# Patient Record
Sex: FEMALE | Race: WHITE | NOT HISPANIC OR LATINO | URBAN - NONMETROPOLITAN AREA
[De-identification: names, ages, dates, MRNs, and addresses within clinical notes are randomized per-mention and may not be internally consistent; named-entity substitution may affect disease eponyms.]

---

## 2020-08-04 ENCOUNTER — OFFICE VISIT (OUTPATIENT)
Dept: URBAN - NONMETROPOLITAN AREA CLINIC 13 | Facility: CLINIC | Age: 75
End: 2020-08-04
Payer: MEDICARE

## 2020-08-04 DIAGNOSIS — K58.9 IBS (IRRITABLE BOWEL SYNDROME): ICD-10-CM

## 2020-08-04 DIAGNOSIS — R10.13 EPIGASTRIC ABDOMINAL PAIN: ICD-10-CM

## 2020-08-04 PROCEDURE — 99214 OFFICE O/P EST MOD 30 MIN: CPT | Performed by: INTERNAL MEDICINE

## 2020-08-04 PROCEDURE — 1036F TOBACCO NON-USER: CPT | Performed by: INTERNAL MEDICINE

## 2020-08-04 RX ORDER — PREDNISONE 5 MG/1
TAKE 1 TABLET (5 MG) BY ORAL ROUTE ONCE DAILY TABLET ORAL 1
Qty: 0 | Refills: 0 | Status: ACTIVE | COMMUNITY
Start: 1900-01-01

## 2020-08-04 RX ORDER — PIOGLITAZONE 30 MG/1
TAKE 1 TABLET (30 MG) BY ORAL ROUTE ONCE DAILY TABLET ORAL 1
Qty: 0 | Refills: 0 | Status: ACTIVE | COMMUNITY
Start: 1900-01-01

## 2020-08-04 RX ORDER — METOPROLOL TARTRATE 50 MG/1
TAKE 1 TABLET (50 MG) BY ORAL ROUTE 2 TIMES PER DAY WITH MEALS TABLET, FILM COATED ORAL 2
Qty: 0 | Refills: 0 | Status: ACTIVE | COMMUNITY
Start: 1900-01-01

## 2020-08-04 RX ORDER — DICYCLOMINE HYDROCHLORIDE 10 MG/1
TAKE 1 CAPSULE (10 MG) BY ORAL ROUTE 4 TIMES PER DAY FOR 30 DAYS CAPSULE ORAL
Qty: 120 | Refills: 3 | Status: ACTIVE | COMMUNITY
Start: 2020-04-28 | End: 2020-08-26

## 2020-08-04 RX ORDER — GABAPENTIN 300 MG/1
TAKE 1 CAPSULE BY ORAL ROUTE DAILY CAPSULE ORAL 1
Qty: 0 | Refills: 0 | Status: ACTIVE | COMMUNITY
Start: 1900-01-01

## 2020-08-04 RX ORDER — ATORVASTATIN CALCIUM 20 MG/1
TAKE 1 TABLET (20 MG) BY ORAL ROUTE ONCE DAILY AT BEDTIME TABLET, FILM COATED ORAL 1
Qty: 0 | Refills: 0 | Status: ACTIVE | COMMUNITY
Start: 1900-01-01

## 2020-08-04 RX ORDER — OMEPRAZOLE 40 MG/1
CAPSULE, DELAYED RELEASE PELLETS ORAL
Qty: 0 | Refills: 0 | Status: ACTIVE | COMMUNITY
Start: 1900-01-01

## 2020-08-04 RX ORDER — LEUCOVORIN CALCIUM 5 MG/1
TAKE 1 TABLET (5 MG) BY ORAL ROUTE ONCE A WEEK TABLET ORAL 1
Qty: 0 | Refills: 0 | Status: ACTIVE | COMMUNITY
Start: 1900-01-01

## 2020-08-04 NOTE — HPI-TODAY'S VISIT:
History Of Present Illness    History Of Present Illness    Ms. Gina Alegria is here for f/u of abdominal pain, nausea, and diarrhea. Last year, she had leg ulcers and on multiple medications. She had likely SIBO and post infectious gastroparesis. She was started on low residue diet, zantac, and AMT. These symptoms resolved and she was able to eat a regular diet. Since her last OV, she weaned off zantac. She had been doing well until she had knee replacement last month. Her reflux/nausea started coming back. She started back zantac 150mg BID and this is helping. She is currently not taking AMT because she is taking pain medication at night and concerned about over sedation. She denies any abdominal pain but has been more bloated. She is having some mild constipation covered with colace and miralax prn. Overall, she feels well today.  4/28 Gina is here for returns Telehealth visit. She was restartedon AMT last visit. She had been on colace and miralax as well for her bowels.  Today she is doing ok. Her bowels seem to be stable. She is on zantac but is off that now. She is on Nexium now but is not helping as much. She is not on bentyl currently. She is having back surgery in a few weeks I Ms. Alegria returns for follow-up.  She recently had back surgery with Dr. Mckinney.  She is slowly recovering from that.  She continues to take 25 mg of amitriptyline.  She remains on these omeprazole and a daily stool softener.  Her bowels for the most part seem to be doing well.  She does have dietary indiscretion at times which makes her symptoms worse.  She has no complaints or other concerns at this time.

## 2021-03-01 ENCOUNTER — TELEPHONE ENCOUNTER (OUTPATIENT)
Dept: URBAN - METROPOLITAN AREA CLINIC 92 | Facility: CLINIC | Age: 76
End: 2021-03-01

## 2021-04-05 ENCOUNTER — OFFICE VISIT (OUTPATIENT)
Dept: URBAN - NONMETROPOLITAN AREA CLINIC 2 | Facility: CLINIC | Age: 76
End: 2021-04-05
Payer: MEDICARE

## 2021-04-05 VITALS
BODY MASS INDEX: 48.87 KG/M2 | HEART RATE: 83 BPM | WEIGHT: 265.6 LBS | DIASTOLIC BLOOD PRESSURE: 83 MMHG | SYSTOLIC BLOOD PRESSURE: 171 MMHG | TEMPERATURE: 96.9 F | HEIGHT: 62 IN

## 2021-04-05 DIAGNOSIS — K58.9 IBS (IRRITABLE BOWEL SYNDROME): ICD-10-CM

## 2021-04-05 DIAGNOSIS — R10.13 EPIGASTRIC ABDOMINAL PAIN: ICD-10-CM

## 2021-04-05 PROCEDURE — 99213 OFFICE O/P EST LOW 20 MIN: CPT | Performed by: NURSE PRACTITIONER

## 2021-04-05 RX ORDER — HYDROXYCHLOROQUINE SULFATE 200 MG/1
AS DIRECTED TABLET, FILM COATED ORAL
Status: ACTIVE | COMMUNITY

## 2021-04-05 RX ORDER — OMEPRAZOLE 40 MG/1
CAPSULE, DELAYED RELEASE PELLETS ORAL
Qty: 0 | Refills: 0 | Status: ON HOLD | COMMUNITY
Start: 1900-01-01

## 2021-04-05 RX ORDER — GABAPENTIN 300 MG/1
TAKE 1 CAPSULE BY ORAL ROUTE DAILY CAPSULE ORAL 1
Qty: 0 | Refills: 0 | Status: ACTIVE | COMMUNITY
Start: 1900-01-01

## 2021-04-05 RX ORDER — PREDNISONE 5 MG/1
TAKE 1 TABLET (5 MG) BY ORAL ROUTE ONCE DAILY TABLET ORAL 1
Qty: 0 | Refills: 0 | Status: ACTIVE | COMMUNITY
Start: 1900-01-01

## 2021-04-05 RX ORDER — TRAMADOL HYDROCHLORIDE 50 MG/1
1 TABLET AS NEEDED TABLET, FILM COATED ORAL ONCE A DAY
Status: ACTIVE | COMMUNITY

## 2021-04-05 RX ORDER — CYCLOBENZAPRINE HYDROCHLORIDE 10 MG/1
1 TABLET AT BEDTIME AS NEEDED TABLET, FILM COATED ORAL ONCE A DAY
Status: ACTIVE | COMMUNITY

## 2021-04-05 RX ORDER — ESOMEPRAZOLE MAGNESIUM 40 MG/1
1 CAPSULE CAPSULE, DELAYED RELEASE ORAL ONCE A DAY
Status: ACTIVE | COMMUNITY

## 2021-04-05 RX ORDER — AMITRIPTYLINE HYDROCHLORIDE 25 MG/1
1 TABLET AT BEDTIME TABLET, FILM COATED ORAL ONCE A DAY
Qty: 90 TABLET | Refills: 3

## 2021-04-05 RX ORDER — PIOGLITAZONE 30 MG/1
TAKE 1 TABLET (30 MG) BY ORAL ROUTE ONCE DAILY TABLET ORAL 1
Qty: 0 | Refills: 0 | Status: ON HOLD | COMMUNITY
Start: 1900-01-01

## 2021-04-05 RX ORDER — ATORVASTATIN CALCIUM 20 MG/1
TAKE 1 TABLET (20 MG) BY ORAL ROUTE ONCE DAILY AT BEDTIME TABLET, FILM COATED ORAL 1
Qty: 0 | Refills: 0 | Status: ACTIVE | COMMUNITY
Start: 1900-01-01

## 2021-04-05 RX ORDER — ACETAMINOPHEN 325 MG
1 TABLET AS NEEDED TABLET ORAL
Status: ACTIVE | COMMUNITY

## 2021-04-05 RX ORDER — AMITRIPTYLINE HYDROCHLORIDE 25 MG/1
1 TABLET AT BEDTIME TABLET, FILM COATED ORAL ONCE A DAY
Status: ACTIVE | COMMUNITY

## 2021-04-05 RX ORDER — METOPROLOL TARTRATE 50 MG/1
TAKE 1 TABLET (50 MG) BY ORAL ROUTE 2 TIMES PER DAY WITH MEALS TABLET, FILM COATED ORAL 2
Qty: 0 | Refills: 0 | Status: ACTIVE | COMMUNITY
Start: 1900-01-01

## 2021-04-05 RX ORDER — LEUCOVORIN CALCIUM 5 MG/1
TAKE 1 TABLET (5 MG) BY ORAL ROUTE ONCE A WEEK TABLET ORAL 1
Qty: 0 | Refills: 0 | Status: ON HOLD | COMMUNITY
Start: 1900-01-01

## 2021-04-05 NOTE — PHYSICAL EXAM CONSTITUTIONAL:
well developed, obsese , in no acute distress , ambulating without difficulty , normal communication ability

## 2021-04-05 NOTE — HPI-TODAY'S VISIT:
4/5/2021 Ms. Alegria is here for f/u of IBS, GERD, and gastroparesis. She was last seen in August. She has been on generic nexium and AMT 25mg at night. Her nausea, vomiting, and stomach pain are well controlled. She will have some bloating after eating fried foods. She has hard stools controlled by colace. She wonders if its ok to take this daily. Overall, she is feeling well today and has no GI complaints. CS

## 2021-04-05 NOTE — HPI-OTHER HISTORIES
Ms. Gina Alegria is here for f/u of abdominal pain, nausea, and diarrhea. Last year, she had leg ulcers and on multiple medications. She had likely SIBO and post infectious gastroparesis. She was started on low residue diet, zantac, and AMT. These symptoms resolved and she was able to eat a regular diet. Since her last OV, she weaned off zantac. She had been doing well until she had knee replacement last month. Her reflux/nausea started coming back. She started back zantac 150mg BID and this is helping. She is currently not taking AMT because she is taking pain medication at night and concerned about over sedation. She denies any abdominal pain but has been more bloated. She is having some mild constipation covered with colace and miralax prn. Overall, she feels well today. CS 4/28 Gina is here for returns Telehealth visit. She was restartedon AMT last visit. She had been on colace and miralax as well for her bowels.  Today she is doing ok. Her bowels seem to be stable. She is on zantac but is off that now. She is on Nexium now but is not helping as much. She is not on bentyl currently. She is having back surgery in a few weeks  8/4/2020 Ms. Alegria returns for follow-up.  She recently had back surgery with Dr. Mckinney.  She is slowly recovering from that.  She continues to take 25 mg of amitriptyline.  She remains on these omeprazole and a daily stool softener.  Her bowels for the most part seem to be doing well.  She does have dietary indiscretion at times which makes her symptoms worse.  She has no complaints or other concerns at this time.

## 2022-04-08 ENCOUNTER — TELEPHONE ENCOUNTER (OUTPATIENT)
Dept: URBAN - NONMETROPOLITAN AREA CLINIC 1 | Facility: CLINIC | Age: 77
End: 2022-04-08

## 2022-04-08 RX ORDER — AMITRIPTYLINE HYDROCHLORIDE 25 MG/1
1 TABLET AT BEDTIME TABLET, FILM COATED ORAL ONCE A DAY
Qty: 90 TABLET | Refills: 0

## 2022-05-31 ENCOUNTER — CLAIMS CREATED FROM THE CLAIM WINDOW (OUTPATIENT)
Dept: URBAN - NONMETROPOLITAN AREA CLINIC 13 | Facility: CLINIC | Age: 77
End: 2022-05-31
Payer: MEDICARE

## 2022-05-31 ENCOUNTER — WEB ENCOUNTER (OUTPATIENT)
Dept: URBAN - NONMETROPOLITAN AREA CLINIC 13 | Facility: CLINIC | Age: 77
End: 2022-05-31

## 2022-05-31 VITALS
TEMPERATURE: 97.2 F | WEIGHT: 260 LBS | HEIGHT: 62 IN | BODY MASS INDEX: 47.84 KG/M2 | HEART RATE: 86 BPM | DIASTOLIC BLOOD PRESSURE: 71 MMHG | SYSTOLIC BLOOD PRESSURE: 148 MMHG

## 2022-05-31 DIAGNOSIS — K58.9 IBS (IRRITABLE BOWEL SYNDROME): ICD-10-CM

## 2022-05-31 DIAGNOSIS — R10.13 EPIGASTRIC ABDOMINAL PAIN: ICD-10-CM

## 2022-05-31 PROCEDURE — 99213 OFFICE O/P EST LOW 20 MIN: CPT | Performed by: NURSE PRACTITIONER

## 2022-05-31 RX ORDER — GABAPENTIN 300 MG/1
TAKE 1 CAPSULE BY ORAL ROUTE DAILY CAPSULE ORAL 1
Qty: 0 | Refills: 0 | Status: ACTIVE | COMMUNITY
Start: 1900-01-01

## 2022-05-31 RX ORDER — ESOMEPRAZOLE MAGNESIUM 40 MG/1
1 CAPSULE CAPSULE, DELAYED RELEASE ORAL ONCE A DAY
Status: ACTIVE | COMMUNITY

## 2022-05-31 RX ORDER — TRAMADOL HYDROCHLORIDE 50 MG/1
1 TABLET AS NEEDED TABLET, FILM COATED ORAL ONCE A DAY
Status: ACTIVE | COMMUNITY

## 2022-05-31 RX ORDER — LEUCOVORIN CALCIUM 5 MG/1
TAKE 1 TABLET (5 MG) BY ORAL ROUTE ONCE A WEEK TABLET ORAL 1
Qty: 0 | Refills: 0 | Status: ON HOLD | COMMUNITY
Start: 1900-01-01

## 2022-05-31 RX ORDER — AMITRIPTYLINE HYDROCHLORIDE 25 MG/1
1 TABLET AT BEDTIME TABLET, FILM COATED ORAL ONCE A DAY
Qty: 90 TABLET | Refills: 3

## 2022-05-31 RX ORDER — AMITRIPTYLINE HYDROCHLORIDE 25 MG/1
1 TABLET AT BEDTIME TABLET, FILM COATED ORAL ONCE A DAY
Qty: 90 TABLET | Refills: 0 | Status: ACTIVE | COMMUNITY

## 2022-05-31 RX ORDER — CYCLOBENZAPRINE HYDROCHLORIDE 10 MG/1
1 TABLET AT BEDTIME AS NEEDED TABLET, FILM COATED ORAL ONCE A DAY
Status: ACTIVE | COMMUNITY

## 2022-05-31 RX ORDER — PANTOPRAZOLE SODIUM 40 MG/1
1 TABLET TABLET, DELAYED RELEASE ORAL ONCE A DAY
Qty: 90 | Refills: 3 | OUTPATIENT
Start: 2022-05-31

## 2022-05-31 RX ORDER — ATORVASTATIN CALCIUM 20 MG/1
TAKE 1 TABLET (20 MG) BY ORAL ROUTE ONCE DAILY AT BEDTIME TABLET, FILM COATED ORAL 1
Qty: 0 | Refills: 0 | Status: ACTIVE | COMMUNITY
Start: 1900-01-01

## 2022-05-31 RX ORDER — ACETAMINOPHEN 325 MG
1 TABLET AS NEEDED TABLET ORAL
Status: ACTIVE | COMMUNITY

## 2022-05-31 RX ORDER — PIOGLITAZONE 30 MG/1
TAKE 1 TABLET (30 MG) BY ORAL ROUTE ONCE DAILY TABLET ORAL 1
Qty: 0 | Refills: 0 | Status: ON HOLD | COMMUNITY
Start: 1900-01-01

## 2022-05-31 RX ORDER — METOPROLOL TARTRATE 50 MG/1
TAKE 1 TABLET (50 MG) BY ORAL ROUTE 2 TIMES PER DAY WITH MEALS TABLET, FILM COATED ORAL 2
Qty: 0 | Refills: 0 | Status: ACTIVE | COMMUNITY
Start: 1900-01-01

## 2022-05-31 RX ORDER — HYDROXYCHLOROQUINE SULFATE 200 MG/1
AS DIRECTED TABLET, FILM COATED ORAL
Status: ACTIVE | COMMUNITY

## 2022-05-31 RX ORDER — PREDNISONE 5 MG/1
TAKE 1 TABLET (5 MG) BY ORAL ROUTE ONCE DAILY TABLET ORAL 1
Qty: 0 | Refills: 0 | Status: ACTIVE | COMMUNITY
Start: 1900-01-01

## 2022-05-31 RX ORDER — OMEPRAZOLE 40 MG/1
CAPSULE, DELAYED RELEASE PELLETS ORAL
Qty: 0 | Refills: 0 | Status: ON HOLD | COMMUNITY
Start: 1900-01-01

## 2022-05-31 NOTE — HPI-OTHER HISTORIES
Ms. Gina Alegria is here for f/u of abdominal pain, nausea, and diarrhea. Last year, she had leg ulcers and on multiple medications. She had likely SIBO and post infectious gastroparesis. She was started on low residue diet, zantac, and AMT. These symptoms resolved and she was able to eat a regular diet. Since her last OV, she weaned off zantac. She had been doing well until she had knee replacement last month. Her reflux/nausea started coming back. She started back zantac 150mg BID and this is helping. She is currently not taking AMT because she is taking pain medication at night and concerned about over sedation. She denies any abdominal pain but has been more bloated. She is having some mild constipation covered with colace and miralax prn. Overall, she feels well today.  4/28 Gina is here for returns Telehealth visit. She was restartedon AMT last visit. She had been on colace and miralax as well for her bowels.  Today she is doing ok. Her bowels seem to be stable. She is on zantac but is off that now. She is on Nexium now but is not helping as much. She is not on bentyl currently. She is having back surgery in a few weeks  8/4/2020 Ms. Alegria returns for follow-up.  She recently had back surgery with Dr. Mckinney.  She is slowly recovering from that.  She continues to take 25 mg of amitriptyline.  She remains on these omeprazole and a daily stool softener.  Her bowels for the most part seem to be doing well.  She does have dietary indiscretion at times which makes her symptoms worse.  She has no complaints or other concerns at this time.   4/5/2021 Ms. Alegria is here for f/u of IBS, GERD, and gastroparesis. She was last seen in August. She has been on generic nexium and AMT 25mg at night. Her nausea, vomiting, and stomach pain are well controlled. She will have some bloating after eating fried foods. She has hard stools controlled by colace. She wonders if its ok to take this daily. Overall, she is feeling well today and has no GI complaints.

## 2022-05-31 NOTE — PHYSICAL EXAM CONSTITUTIONAL:
well developed, overweight , in no acute distress , ambulating with difficulty , normal communication ability

## 2022-05-31 NOTE — HPI-TODAY'S VISIT:
5/31/2022 Ms. Alegria is here for f/u of IBS, GERD, and gastroparesis. She was last seen a year ago. She has been on generic nexium and AMT 25mg at night. She denies any recent issues with nausea, vomiting, or bloating. She has been through a lot this year with personal stresses and her GI symptoms have been ok. Overall, she is feeling well and has no GI complaints. CS

## 2022-11-29 ENCOUNTER — OFFICE VISIT (OUTPATIENT)
Dept: URBAN - NONMETROPOLITAN AREA CLINIC 13 | Facility: CLINIC | Age: 77
End: 2022-11-29

## 2023-06-12 ENCOUNTER — TELEPHONE ENCOUNTER (OUTPATIENT)
Dept: URBAN - NONMETROPOLITAN AREA CLINIC 2 | Facility: CLINIC | Age: 78
End: 2023-06-12

## 2023-06-12 RX ORDER — PANTOPRAZOLE SODIUM 40 MG/1
1 TABLET TABLET, DELAYED RELEASE ORAL ONCE A DAY
Qty: 90 | Refills: 3
Start: 2022-05-31

## 2023-07-20 ENCOUNTER — DASHBOARD ENCOUNTERS (OUTPATIENT)
Age: 78
End: 2023-07-20

## 2023-07-20 ENCOUNTER — LAB OUTSIDE AN ENCOUNTER (OUTPATIENT)
Dept: URBAN - NONMETROPOLITAN AREA CLINIC 13 | Facility: CLINIC | Age: 78
End: 2023-07-20

## 2023-07-20 ENCOUNTER — TELEPHONE ENCOUNTER (OUTPATIENT)
Dept: URBAN - NONMETROPOLITAN AREA CLINIC 13 | Facility: CLINIC | Age: 78
End: 2023-07-20

## 2023-07-20 ENCOUNTER — OFFICE VISIT (OUTPATIENT)
Dept: URBAN - NONMETROPOLITAN AREA CLINIC 13 | Facility: CLINIC | Age: 78
End: 2023-07-20
Payer: MEDICARE

## 2023-07-20 VITALS
SYSTOLIC BLOOD PRESSURE: 146 MMHG | DIASTOLIC BLOOD PRESSURE: 70 MMHG | HEIGHT: 62 IN | WEIGHT: 263 LBS | TEMPERATURE: 98.1 F | BODY MASS INDEX: 48.4 KG/M2 | HEART RATE: 67 BPM

## 2023-07-20 DIAGNOSIS — R10.13 EPIGASTRIC ABDOMINAL PAIN: ICD-10-CM

## 2023-07-20 DIAGNOSIS — Z12.11 COLON CANCER SCREENING: ICD-10-CM

## 2023-07-20 DIAGNOSIS — K58.8 OTHER IRRITABLE BOWEL SYNDROME: ICD-10-CM

## 2023-07-20 PROBLEM — 10743008 IRRITABLE BOWEL SYNDROME: Status: ACTIVE | Noted: 2021-04-02

## 2023-07-20 PROCEDURE — 99214 OFFICE O/P EST MOD 30 MIN: CPT | Performed by: NURSE PRACTITIONER

## 2023-07-20 RX ORDER — DULOXETINE 30 MG/1
1 CAPSULE CAPSULE, DELAYED RELEASE PELLETS ORAL ONCE A DAY
Status: ACTIVE | COMMUNITY

## 2023-07-20 RX ORDER — ATORVASTATIN CALCIUM 20 MG/1
TAKE 1 TABLET (20 MG) BY ORAL ROUTE ONCE DAILY AT BEDTIME TABLET, FILM COATED ORAL 1
Qty: 0 | Refills: 0 | Status: ACTIVE | COMMUNITY
Start: 1900-01-01

## 2023-07-20 RX ORDER — PANTOPRAZOLE SODIUM 40 MG/1
1 TABLET TABLET, DELAYED RELEASE ORAL ONCE A DAY
Qty: 90 | Refills: 3 | Status: ACTIVE | COMMUNITY
Start: 2022-05-31

## 2023-07-20 RX ORDER — FUROSEMIDE 20 MG/1
1 TABLET TABLET ORAL ONCE A DAY
Status: ACTIVE | COMMUNITY

## 2023-07-20 RX ORDER — OMEPRAZOLE 40 MG/1
CAPSULE, DELAYED RELEASE PELLETS ORAL
Qty: 0 | Refills: 0 | Status: ON HOLD | COMMUNITY
Start: 1900-01-01

## 2023-07-20 RX ORDER — PANTOPRAZOLE SODIUM 40 MG/1
1 TABLET TABLET, DELAYED RELEASE ORAL ONCE A DAY
Qty: 90 | Refills: 3 | OUTPATIENT

## 2023-07-20 RX ORDER — LEUCOVORIN CALCIUM 5 MG/1
TAKE 1 TABLET (5 MG) BY ORAL ROUTE ONCE A WEEK TABLET ORAL 1
Qty: 0 | Refills: 0 | Status: ON HOLD | COMMUNITY
Start: 1900-01-01

## 2023-07-20 RX ORDER — TRAMADOL HYDROCHLORIDE 50 MG/1
1 TABLET AS NEEDED TABLET, FILM COATED ORAL ONCE A DAY
Status: ACTIVE | COMMUNITY

## 2023-07-20 RX ORDER — DOCUSATE SODIUM 100 MG/1
1 TABLET AS NEEDED TABLET ORAL ONCE A DAY
Status: ACTIVE | COMMUNITY

## 2023-07-20 RX ORDER — ACETAMINOPHEN 325 MG
1 TABLET AS NEEDED TABLET ORAL
Status: ACTIVE | COMMUNITY

## 2023-07-20 RX ORDER — GABAPENTIN 300 MG/1
TAKE 1 CAPSULE BY ORAL ROUTE DAILY CAPSULE ORAL 1
Qty: 0 | Refills: 0 | Status: ACTIVE | COMMUNITY
Start: 1900-01-01

## 2023-07-20 RX ORDER — AMITRIPTYLINE HYDROCHLORIDE 25 MG/1
1 TABLET AT BEDTIME TABLET, FILM COATED ORAL ONCE A DAY
Qty: 90 TABLET | Refills: 3

## 2023-07-20 RX ORDER — PIOGLITAZONE 30 MG/1
TAKE 1 TABLET (30 MG) BY ORAL ROUTE ONCE DAILY TABLET ORAL 1
Qty: 0 | Refills: 0 | Status: ACTIVE | COMMUNITY
Start: 1900-01-01

## 2023-07-20 RX ORDER — METOPROLOL TARTRATE 50 MG/1
TAKE 1 TABLET (50 MG) BY ORAL ROUTE 2 TIMES PER DAY WITH MEALS TABLET, FILM COATED ORAL 2
Qty: 0 | Refills: 0 | Status: ACTIVE | COMMUNITY
Start: 1900-01-01

## 2023-07-20 RX ORDER — AMITRIPTYLINE HYDROCHLORIDE 25 MG/1
1 TABLET AT BEDTIME TABLET, FILM COATED ORAL ONCE A DAY
Qty: 90 TABLET | Refills: 3 | Status: ACTIVE | COMMUNITY

## 2023-07-20 RX ORDER — CYCLOBENZAPRINE HYDROCHLORIDE 10 MG/1
1 TABLET AT BEDTIME AS NEEDED TABLET, FILM COATED ORAL ONCE A DAY
Status: ACTIVE | COMMUNITY

## 2023-07-20 NOTE — HPI-TODAY'S VISIT:
7/20/2022 Ms. Alegria is here for f/u of IBS, GERD, and gastroparesis. She was last seen a year ago. She is taking protonix and AMT 25mg at night with good control of her GI symptoms. She has been able to eat normally. Since her last OV, she had a reaction to an osteoporosis medication last December. She has been having issues with her O2 stat dropping at night. She may need a CPAP. She has renal cyst that has increased in size and may need surgery. She has a repeat CT in August. She is due for colon cancer screening this year. CS

## 2023-07-20 NOTE — HPI-OTHER HISTORIES
Ms. Gina Alegria is here for f/u of abdominal pain, nausea, and diarrhea. Last year, she had leg ulcers and on multiple medications. She had likely SIBO and post infectious gastroparesis. She was started on low residue diet, zantac, and AMT. These symptoms resolved and she was able to eat a regular diet. Since her last OV, she weaned off zantac. She had been doing well until she had knee replacement last month. Her reflux/nausea started coming back. She started back zantac 150mg BID and this is helping. She is currently not taking AMT because she is taking pain medication at night and concerned about over sedation. She denies any abdominal pain but has been more bloated. She is having some mild constipation covered with colace and miralax prn. Overall, she feels well today. CS 4/28 Gina is here for returns Telehealth visit. She was restartedon AMT last visit. She had been on colace and miralax as well for her bowels.  Today she is doing ok. Her bowels seem to be stable. She is on zantac but is off that now. She is on Nexium now but is not helping as much. She is not on bentyl currently. She is having back surgery in a few weeks  8/4/2020 Ms. Alegria returns for follow-up.  She recently had back surgery with Dr. Mckinney.  She is slowly recovering from that.  She continues to take 25 mg of amitriptyline.  She remains on these omeprazole and a daily stool softener.  Her bowels for the most part seem to be doing well.  She does have dietary indiscretion at times which makes her symptoms worse.  She has no complaints or other concerns at this time.   4/5/2021 Ms. Alegria is here for f/u of IBS, GERD, and gastroparesis. She was last seen in August. She has been on generic nexium and AMT 25mg at night. Her nausea, vomiting, and stomach pain are well controlled. She will have some bloating after eating fried foods. She has hard stools controlled by colace. She wonders if its ok to take this daily. Overall, she is feeling well today and has no GI complaints.   5/31/2022 Ms. Alegria is here for f/u of IBS, GERD, and gastroparesis. She was last seen a year ago. She has been on generic nexium and AMT 25mg at night. She denies any recent issues with nausea, vomiting, or bloating. She has been through a lot this year with personal stresses and her GI symptoms have been ok. Overall, she is feeling well and has no GI complaints. CS

## 2023-10-20 ENCOUNTER — OFFICE VISIT (OUTPATIENT)
Dept: URBAN - METROPOLITAN AREA MEDICAL CENTER 1 | Facility: MEDICAL CENTER | Age: 78
End: 2023-10-20

## 2024-07-11 ENCOUNTER — TELEPHONE ENCOUNTER (OUTPATIENT)
Dept: URBAN - NONMETROPOLITAN AREA CLINIC 2 | Facility: CLINIC | Age: 79
End: 2024-07-11

## 2024-07-11 RX ORDER — AMITRIPTYLINE HYDROCHLORIDE 25 MG/1
1 TABLET AT BEDTIME TABLET, FILM COATED ORAL ONCE A DAY
Qty: 90 TABLET | Refills: 3

## 2024-09-03 ENCOUNTER — OFFICE VISIT (OUTPATIENT)
Dept: URBAN - NONMETROPOLITAN AREA CLINIC 13 | Facility: CLINIC | Age: 79
End: 2024-09-03
Payer: MEDICARE

## 2024-09-03 VITALS
BODY MASS INDEX: 49.13 KG/M2 | DIASTOLIC BLOOD PRESSURE: 84 MMHG | HEART RATE: 81 BPM | SYSTOLIC BLOOD PRESSURE: 136 MMHG | HEIGHT: 62 IN | WEIGHT: 267 LBS

## 2024-09-03 DIAGNOSIS — K58.8 OTHER IRRITABLE BOWEL SYNDROME: ICD-10-CM

## 2024-09-03 DIAGNOSIS — R10.13 EPIGASTRIC ABDOMINAL PAIN: ICD-10-CM

## 2024-09-03 PROCEDURE — 99213 OFFICE O/P EST LOW 20 MIN: CPT | Performed by: NURSE PRACTITIONER

## 2024-09-03 RX ORDER — DOCUSATE SODIUM 100 MG/1
1 TABLET AS NEEDED TABLET ORAL ONCE A DAY
Status: ACTIVE | COMMUNITY

## 2024-09-03 RX ORDER — ATORVASTATIN CALCIUM 20 MG/1
TAKE 1 TABLET (20 MG) BY ORAL ROUTE ONCE DAILY AT BEDTIME TABLET, FILM COATED ORAL 1
Qty: 0 | Refills: 0 | Status: ACTIVE | COMMUNITY
Start: 1900-01-01

## 2024-09-03 RX ORDER — AMITRIPTYLINE HYDROCHLORIDE 25 MG/1
1 TABLET AT BEDTIME TABLET, FILM COATED ORAL ONCE A DAY
Qty: 90 TABLET | Refills: 3

## 2024-09-03 RX ORDER — TRAMADOL HYDROCHLORIDE 50 MG/1
1 TABLET AS NEEDED TABLET, FILM COATED ORAL ONCE A DAY
Status: ACTIVE | COMMUNITY

## 2024-09-03 RX ORDER — PIOGLITAZONE 30 MG/1
TAKE 1 TABLET (30 MG) BY ORAL ROUTE ONCE DAILY TABLET ORAL 1
Qty: 0 | Refills: 0 | Status: ACTIVE | COMMUNITY
Start: 1900-01-01

## 2024-09-03 RX ORDER — DULOXETINE 30 MG/1
1 CAPSULE CAPSULE, DELAYED RELEASE PELLETS ORAL ONCE A DAY
Status: DISCONTINUED | COMMUNITY

## 2024-09-03 RX ORDER — PANTOPRAZOLE SODIUM 40 MG/1
1 TABLET TABLET, DELAYED RELEASE ORAL ONCE A DAY
Qty: 90 | Refills: 3 | OUTPATIENT

## 2024-09-03 RX ORDER — ACETAMINOPHEN 325 MG/1
1 TABLET AS NEEDED TABLET, FILM COATED ORAL
Status: ACTIVE | COMMUNITY

## 2024-09-03 RX ORDER — MECOBALAMIN 5000 MCG
AS DIRECTED LOZENGE ORAL
Status: ACTIVE | COMMUNITY

## 2024-09-03 RX ORDER — GABAPENTIN 300 MG/1
TAKE 1 CAPSULE BY ORAL ROUTE DAILY CAPSULE ORAL 1
Qty: 0 | Refills: 0 | Status: ACTIVE | COMMUNITY
Start: 1900-01-01

## 2024-09-03 RX ORDER — METOPROLOL TARTRATE 50 MG/1
TAKE 1 TABLET (50 MG) BY ORAL ROUTE 2 TIMES PER DAY WITH MEALS TABLET, FILM COATED ORAL 2
Qty: 0 | Refills: 0 | Status: ACTIVE | COMMUNITY
Start: 1900-01-01

## 2024-09-03 RX ORDER — CYCLOBENZAPRINE HYDROCHLORIDE 10 MG/1
1 TABLET AT BEDTIME AS NEEDED TABLET, FILM COATED ORAL ONCE A DAY
Status: ACTIVE | COMMUNITY

## 2024-09-03 RX ORDER — LEUCOVORIN CALCIUM 5 MG/1
TAKE 1 TABLET (5 MG) BY ORAL ROUTE ONCE A WEEK TABLET ORAL 1
Qty: 0 | Refills: 0 | Status: ON HOLD | COMMUNITY
Start: 1900-01-01

## 2024-09-03 RX ORDER — PANTOPRAZOLE SODIUM 40 MG/1
1 TABLET TABLET, DELAYED RELEASE ORAL ONCE A DAY
Qty: 90 | Refills: 3 | Status: ACTIVE | COMMUNITY

## 2024-09-03 RX ORDER — AMITRIPTYLINE HYDROCHLORIDE 25 MG/1
1 TABLET AT BEDTIME TABLET, FILM COATED ORAL ONCE A DAY
Qty: 90 TABLET | Refills: 3 | Status: ACTIVE | COMMUNITY

## 2024-09-03 RX ORDER — FUROSEMIDE 20 MG/1
1 TABLET TABLET ORAL ONCE A DAY
Status: ACTIVE | COMMUNITY

## 2024-09-03 RX ORDER — OMEPRAZOLE 40 MG/1
CAPSULE, DELAYED RELEASE PELLETS ORAL
Qty: 0 | Refills: 0 | Status: ON HOLD | COMMUNITY
Start: 1900-01-01

## 2024-09-03 NOTE — HPI-OTHER HISTORIES
Ms. Gina Alegria is here for f/u of abdominal pain, nausea, and diarrhea. Last year, she had leg ulcers and on multiple medications. She had likely SIBO and post infectious gastroparesis. She was started on low residue diet, zantac, and AMT. These symptoms resolved and she was able to eat a regular diet. Since her last OV, she weaned off zantac. She had been doing well until she had knee replacement last month. Her reflux/nausea started coming back. She started back zantac 150mg BID and this is helping. She is currently not taking AMT because she is taking pain medication at night and concerned about over sedation. She denies any abdominal pain but has been more bloated. She is having some mild constipation covered with colace and miralax prn. Overall, she feels well today. CS 4/28 Gina is here for returns Telehealth visit. She was restartedon AMT last visit. She had been on colace and miralax as well for her bowels.  Today she is doing ok. Her bowels seem to be stable. She is on zantac but is off that now. She is on Nexium now but is not helping as much. She is not on bentyl currently. She is having back surgery in a few weeks  8/4/2020 Ms. Alegria returns for follow-up.  She recently had back surgery with Dr. Mckinney.  She is slowly recovering from that.  She continues to take 25 mg of amitriptyline.  She remains on these omeprazole and a daily stool softener.  Her bowels for the most part seem to be doing well.  She does have dietary indiscretion at times which makes her symptoms worse.  She has no complaints or other concerns at this time.   4/5/2021 Ms. Alegria is here for f/u of IBS, GERD, and gastroparesis. She was last seen in August. She has been on generic nexium and AMT 25mg at night. Her nausea, vomiting, and stomach pain are well controlled. She will have some bloating after eating fried foods. She has hard stools controlled by colace. She wonders if its ok to take this daily. Overall, she is feeling well today and has no GI complaints.   5/31/2022 Ms. Alegria is here for f/u of IBS, GERD, and gastroparesis. She was last seen a year ago. She has been on generic nexium and AMT 25mg at night. She denies any recent issues with nausea, vomiting, or bloating. She has been through a lot this year with personal stresses and her GI symptoms have been ok. Overall, she is feeling well and has no GI complaints. CS  7/20/2022 Ms. Alegria is here for f/u of IBS, GERD, and gastroparesis. She was last seen a year ago. She is taking protonix and AMT 25mg at night with good control of her GI symptoms. She has been able to eat normally. Since her last OV, she had a reaction to an osteoporosis medication last December. She has been having issues with her O2 stat dropping at night. She may need a CPAP. She has renal cyst that has increased in size and may need surgery. She has a repeat CT in August. She is due for colon cancer screening this year. CS

## 2024-09-03 NOTE — HPI-TODAY'S VISIT:
9/3/2024 Ms. Alegria is here for f/u of IBS, GERD, and gastroparesis. She was last seen a year ago. She is taking protonix and AMT 25mg at night with good control of her GI symptoms. She did take a medication for osteoporosis that caused diarrhea so she held her colace. She is off this and now back to having some minor constipation.  She is still trying to figure out what to do about her kidney cyst. She had a recent MRI. She is using her CPAP but it is leaking around the hose. She is due for colon cancer screening. She is not ready to schedule. CS

## 2025-03-18 ENCOUNTER — OFFICE VISIT (OUTPATIENT)
Dept: URBAN - NONMETROPOLITAN AREA CLINIC 13 | Facility: CLINIC | Age: 80
End: 2025-03-18
Payer: MEDICARE

## 2025-03-18 VITALS
HEIGHT: 62 IN | BODY MASS INDEX: 50.9 KG/M2 | DIASTOLIC BLOOD PRESSURE: 79 MMHG | SYSTOLIC BLOOD PRESSURE: 145 MMHG | WEIGHT: 276.6 LBS | HEART RATE: 97 BPM

## 2025-03-18 DIAGNOSIS — Z12.11 COLON CANCER SCREENING: ICD-10-CM

## 2025-03-18 DIAGNOSIS — R10.13 EPIGASTRIC ABDOMINAL PAIN: ICD-10-CM

## 2025-03-18 DIAGNOSIS — K58.9 IBS (IRRITABLE BOWEL SYNDROME): ICD-10-CM

## 2025-03-18 PROCEDURE — 99213 OFFICE O/P EST LOW 20 MIN: CPT | Performed by: NURSE PRACTITIONER

## 2025-03-18 RX ORDER — TRAMADOL HYDROCHLORIDE 50 MG/1
1 TABLET AS NEEDED TABLET, FILM COATED ORAL ONCE A DAY
Status: ACTIVE | COMMUNITY

## 2025-03-18 RX ORDER — DULOXETINE 30 MG/1
CAPSULE, DELAYED RELEASE ORAL
Qty: 90 CAPSULE | Status: ACTIVE | COMMUNITY

## 2025-03-18 RX ORDER — AMITRIPTYLINE HYDROCHLORIDE 25 MG/1
1 TABLET AT BEDTIME TABLET, FILM COATED ORAL ONCE A DAY
Qty: 90 TABLET | Refills: 3

## 2025-03-18 RX ORDER — OMEPRAZOLE 40 MG/1
CAPSULE, DELAYED RELEASE PELLETS ORAL
Qty: 0 | Refills: 0 | Status: ON HOLD | COMMUNITY
Start: 1900-01-01

## 2025-03-18 RX ORDER — ACETAMINOPHEN 325 MG/1
1 TABLET AS NEEDED TABLET, FILM COATED ORAL
Status: ACTIVE | COMMUNITY

## 2025-03-18 RX ORDER — GABAPENTIN 300 MG/1
TAKE 1 CAPSULE BY ORAL ROUTE DAILY CAPSULE ORAL 1
Qty: 0 | Refills: 0 | Status: ACTIVE | COMMUNITY
Start: 1900-01-01

## 2025-03-18 RX ORDER — PANTOPRAZOLE SODIUM 40 MG/1
1 TABLET TABLET, DELAYED RELEASE ORAL ONCE A DAY
Qty: 90 | Refills: 3 | OUTPATIENT

## 2025-03-18 RX ORDER — METOPROLOL TARTRATE 50 MG/1
TAKE 1 TABLET (50 MG) BY ORAL ROUTE 2 TIMES PER DAY WITH MEALS TABLET, FILM COATED ORAL 2
Qty: 0 | Refills: 0 | Status: ACTIVE | COMMUNITY
Start: 1900-01-01

## 2025-03-18 RX ORDER — PIOGLITAZONE 30 MG/1
TAKE 1 TABLET (30 MG) BY ORAL ROUTE ONCE DAILY TABLET ORAL 1
Qty: 0 | Refills: 0 | Status: ACTIVE | COMMUNITY
Start: 1900-01-01

## 2025-03-18 RX ORDER — LEUCOVORIN CALCIUM 5 MG/1
TAKE 1 TABLET (5 MG) BY ORAL ROUTE ONCE A WEEK TABLET ORAL 1
Qty: 0 | Refills: 0 | Status: ON HOLD | COMMUNITY
Start: 1900-01-01

## 2025-03-18 RX ORDER — CEPHALEXIN 500 MG/1
CAPSULE ORAL
Qty: 20 CAPSULE | Status: ACTIVE | COMMUNITY

## 2025-03-18 RX ORDER — CYCLOBENZAPRINE HYDROCHLORIDE 10 MG/1
1 TABLET AT BEDTIME AS NEEDED TABLET, FILM COATED ORAL ONCE A DAY
Status: ACTIVE | COMMUNITY

## 2025-03-18 RX ORDER — PANTOPRAZOLE SODIUM 40 MG/1
1 TABLET TABLET, DELAYED RELEASE ORAL ONCE A DAY
Qty: 90 | Refills: 3 | Status: ACTIVE | COMMUNITY

## 2025-03-18 RX ORDER — AMITRIPTYLINE HYDROCHLORIDE 25 MG/1
1 TABLET AT BEDTIME TABLET, FILM COATED ORAL ONCE A DAY
Qty: 90 TABLET | Refills: 3 | Status: ACTIVE | COMMUNITY

## 2025-03-18 RX ORDER — DOCUSATE SODIUM 100 MG/1
1 TABLET AS NEEDED TABLET ORAL ONCE A DAY
Status: ACTIVE | COMMUNITY

## 2025-03-18 RX ORDER — ATORVASTATIN CALCIUM 20 MG/1
TAKE 1 TABLET (20 MG) BY ORAL ROUTE ONCE DAILY AT BEDTIME TABLET, FILM COATED ORAL 1
Qty: 0 | Refills: 0 | Status: ACTIVE | COMMUNITY
Start: 1900-01-01

## 2025-03-18 RX ORDER — FUROSEMIDE 20 MG/1
1 TABLET TABLET ORAL ONCE A DAY
Status: ACTIVE | COMMUNITY

## 2025-03-18 RX ORDER — MECOBALAMIN 5000 MCG
AS DIRECTED LOZENGE ORAL
Status: ACTIVE | COMMUNITY

## 2025-03-18 NOTE — HPI-OTHER HISTORIES
Ms. Gina Alegria is here for f/u of abdominal pain, nausea, and diarrhea. Last year, she had leg ulcers and on multiple medications. She had likely SIBO and post infectious gastroparesis. She was started on low residue diet, zantac, and AMT. These symptoms resolved and she was able to eat a regular diet. Since her last OV, she weaned off zantac. She had been doing well until she had knee replacement last month. Her reflux/nausea started coming back. She started back zantac 150mg BID and this is helping. She is currently not taking AMT because she is taking pain medication at night and concerned about over sedation. She denies any abdominal pain but has been more bloated. She is having some mild constipation covered with colace and miralax prn. Overall, she feels well today. CS 4/28 Gina is here for returns Telehealth visit. She was restartedon AMT last visit. She had been on colace and miralax as well for her bowels.  Today she is doing ok. Her bowels seem to be stable. She is on zantac but is off that now. She is on Nexium now but is not helping as much. She is not on bentyl currently. She is having back surgery in a few weeks  8/4/2020 Ms. Alegria returns for follow-up.  She recently had back surgery with Dr. Mckinney.  She is slowly recovering from that.  She continues to take 25 mg of amitriptyline.  She remains on these omeprazole and a daily stool softener.  Her bowels for the most part seem to be doing well.  She does have dietary indiscretion at times which makes her symptoms worse.  She has no complaints or other concerns at this time.   4/5/2021 Ms. Alegria is here for f/u of IBS, GERD, and gastroparesis. She was last seen in August. She has been on generic nexium and AMT 25mg at night. Her nausea, vomiting, and stomach pain are well controlled. She will have some bloating after eating fried foods. She has hard stools controlled by colace. She wonders if its ok to take this daily. Overall, she is feeling well today and has no GI complaints.   5/31/2022 Ms. Alegria is here for f/u of IBS, GERD, and gastroparesis. She was last seen a year ago. She has been on generic nexium and AMT 25mg at night. She denies any recent issues with nausea, vomiting, or bloating. She has been through a lot this year with personal stresses and her GI symptoms have been ok. Overall, she is feeling well and has no GI complaints. CS  7/20/2022 Ms. Alegria is here for f/u of IBS, GERD, and gastroparesis. She was last seen a year ago. She is taking protonix and AMT 25mg at night with good control of her GI symptoms. She has been able to eat normally. Since her last OV, she had a reaction to an osteoporosis medication last December. She has been having issues with her O2 stat dropping at night. She may need a CPAP. She has renal cyst that has increased in size and may need surgery. She has a repeat CT in August. She is due for colon cancer screening this year. CS  9/3/2024 Ms. Alegria is here for f/u of IBS, GERD, and gastroparesis. She was last seen a year ago. She is taking protonix and AMT 25mg at night with good control of her GI symptoms. She did take a medication for osteoporosis that caused diarrhea so she held her colace. She is off this and now back to having some minor constipation. She is still trying to figure out what to do about her kidney cyst. She had a recent MRI. She is using her CPAP but it is leaking around the hose. She is due for colon cancer screening. She is not ready to schedule. CS

## 2025-03-18 NOTE — PHYSICAL EXAM HENT:
Head,  normocephalic,  atraumatic,  Face,  Face within normal limits,  Ears,  External ears within normal limits,  Nose/Nasopharynx,  External nose  normal appearance,  Lips,  Appearance normal
no

## 2025-03-18 NOTE — HPI-TODAY'S VISIT:
3/18/2025 Ms. Alegria is here for f/u of IBS, GERD, and gastroparesis. She was last seen a year ago. She is taking protonix and AMT 25mg at night with good control of her GI symptoms. She has osteoporosis and RA. She has been on multiple medications and these have had SE that make them intolerable. She has discussed with children and wish to limit further medications and treatments if not improving her quality of life. She does not wish to proceed with a colonoscopy and understands the risk. CS